# Patient Record
Sex: MALE | Race: WHITE | NOT HISPANIC OR LATINO | Employment: FULL TIME | URBAN - METROPOLITAN AREA
[De-identification: names, ages, dates, MRNs, and addresses within clinical notes are randomized per-mention and may not be internally consistent; named-entity substitution may affect disease eponyms.]

---

## 2019-01-29 ENCOUNTER — APPOINTMENT (OUTPATIENT)
Dept: RADIOLOGY | Facility: IMAGING CENTER | Age: 54
End: 2019-01-29
Attending: FAMILY MEDICINE
Payer: COMMERCIAL

## 2019-01-29 ENCOUNTER — OCCUPATIONAL MEDICINE (OUTPATIENT)
Dept: URGENT CARE | Facility: PHYSICIAN GROUP | Age: 54
End: 2019-01-29
Payer: COMMERCIAL

## 2019-01-29 VITALS
RESPIRATION RATE: 14 BRPM | TEMPERATURE: 98 F | SYSTOLIC BLOOD PRESSURE: 130 MMHG | OXYGEN SATURATION: 97 % | HEART RATE: 88 BPM | DIASTOLIC BLOOD PRESSURE: 84 MMHG | WEIGHT: 267 LBS

## 2019-01-29 DIAGNOSIS — S93.402A SPRAIN OF LEFT ANKLE, UNSPECIFIED LIGAMENT, INITIAL ENCOUNTER: ICD-10-CM

## 2019-01-29 DIAGNOSIS — S82.832A OTHER CLOSED FRACTURE OF DISTAL END OF LEFT FIBULA, INITIAL ENCOUNTER: ICD-10-CM

## 2019-01-29 PROCEDURE — 99204 OFFICE O/P NEW MOD 45 MIN: CPT | Performed by: FAMILY MEDICINE

## 2019-01-29 PROCEDURE — 73630 X-RAY EXAM OF FOOT: CPT | Mod: TC,LT,29 | Performed by: NURSE PRACTITIONER

## 2019-01-29 PROCEDURE — 73610 X-RAY EXAM OF ANKLE: CPT | Mod: TC,LT,29 | Performed by: NURSE PRACTITIONER

## 2019-01-29 NOTE — LETTER
EMPLOYEE’S CLAIM FOR COMPENSATION/ REPORT OF INITIAL TREATMENT  FORM C-4    EMPLOYEE’S CLAIM - PROVIDE ALL INFORMATION REQUESTED   First Name  Wang Last Name  South Birthdate                    1965                Sex  male Claim Number   Home Address  [address] Age  53 y.o. Height   Weight  121.1 kg (267 lb) [facility]     [place] Zip  [address] Telephone  [phone] (home)    Mailing Address  [address] [place]  [address] Primary Language Spoken  English    Insurer   Third Party   Monika Shah   Employee's Occupation (Job Title) When Injury or Occupational Disease Occurred      Employer's Name  [employer]  Telephone      Employer Address  [address]  [facility]     Date of Injury  1/8/2019               Hour of Injury  11:35 AM Date Employer Notified  1/8/2019 Last Day of Work after Injury or Occupational Disease  1/29/2019 Supervisor to Whom Injury Reported  Kevin Fuentes   Address or Location of Accident (if applicable)  [[address]]   What were you doing at the time of accident? (if applicable)  step backwards    How did this injury or occupational disease occur? (Be specific an answer in detail. Use additional sheet if necessary)  Stepped backwards on a tire chain twist ankle   If you believe that you have an occupational disease, when did you first have knowledge of the disability and it relationship to your employment?  n/a Witnesses to the Accident  none      Nature of Injury or Occupational Disease  Workers' Compensation  Part(s) of Body Injured or Affected  Ankle (L), ,     I certify that the above is true and correct to the best of my knowledge and that I have provided this information in order to obtain the benefits of [place]  [place]  I hereby authorize any physician, chiropractor, surgeon, practitioner, or other person, any hospital, including [facility] hospital, any medical service organization, any insurance company, or other institution or organization to release to each other, any medical or other information, including benefits paid or payable, pertinent to this injury or disease, except information relative to diagnosis, treatment and/or counseling for AIDS, psychological conditions, alcohol or controlled substances, for which I must give specific authorization.  A Photostat of this authorization shall be as valid as the original.     Date 01/29/19   [facility]    Employee’s Signature   THIS REPORT MUST BE COMPLETED AND MAILED WITHIN 3 WORKING DAYS OF TREATMENT   [facility]  Name of Facility  [place]   Date  1/29/2019 Diagnosis  (S82.832A) Other closed fracture of distal end of left fibula, initial encounter Is there evidence the injured employee was under the influence of alcohol and/or another controlled substance at the time of accident?   Hour  9:23 AM Description of Injury or Disease  The encounter diagnosis was Other closed fracture of distal end of left fibula, initial encounter. No   Treatment  closed fracture of distal end of left fibula, initial encounter  Posterior splint  Crutches - non wt bearing on left  Restrictions per D39  otc motrin, prn pain    Case transferred to ortho    Have you advised the patient to remain off work five days or more? No   X-Ray Findings      If Yes   From Date  To Date      From information given by the employee, together with medical evidence, can you directly connect this injury or occupational disease as job incurred?  Yes If No Full Duty  No Modified Duty  Yes   Is additional medical care by a physician indicated?  Yes If Modified Duty, Specify any Limitations / Restrictions  Restrictions per D39     Do you  "know of any previous injury or disease contributing to this condition or occupational disease?                            No   Date  1/29/2019 Print Doctor’s Name Mor Del Rio M.D. I certify the employer’s copy of  this form was mailed on:   Address  [address] Insurer’s Use Only     [place] Zip  [address]    Provider’s Tax ID Number  [id number] Telephone  Dept: [phone]            e-Signature: Dr. Rick Fraser, Medical Director Degree  MD        ORIGINAL-TREATING PHYSICIAN OR CHIROPRACTOR    PAGE 2-INSURER/TPA    PAGE 3-EMPLOYER    PAGE 4-EMPLOYEE             Form C-4 (rev10/07)              BRIEF DESCRIPTION OF RIGHTS AND BENEFITS  (Pursuant to [place]RS 616C.050)    Notice of Injury or Occupational Disease (Incident Report Form C-1): If an injury or occupational disease (OD) arises out of and in the  course of employment, you must provide written notice to your employer as soon as practicable, but no later than 7 days after the accident or  OD. Your employer shall maintain a sufficient supply of the required forms.    Claim for Compensation (Form C-4): If medical treatment is sought, the form C-4 is available at the place of initial treatment. A completed  \"Claim for Compensation\" (Form C-4) must be filed within 90 days after an accident or OD. The treating physician or chiropractor must,  within 3 working days after treatment, complete and mail to the employer, the employer's insurer and third-party , the Claim for  Compensation.    Medical Treatment: If you require medical treatment for your on-the-job injury or OD, you may be required to select a physician or  chiropractor from a list provided by your workers’ compensation insurer, if it has contracted with an Organization for Managed Care (MCO) or  Preferred Provider Organization (PPO) or providers of health care. If your employer has not entered into a contract with an MCO or PPO, you  may select a physician " or chiropractor from the Panel of Physicians and Chiropractors. Any medical costs related to your industrial injury or  OD will be paid by your insurer.    Temporary Total Disability (TTD): If your doctor has certified that you are unable to work for a period of at least 5 consecutive days, or 5  cumulative days in a 20-day period, or places restrictions on you that your employer does not accommodate, you may be entitled to TTD  compensation.    Temporary Partial Disability (TPD): If the wage you receive upon reemployment is less than the compensation for TTD to which you are  entitled, the insurer may be required to pay you TPD compensation to make up the difference. TPD can only be paid for a maximum of 24  months.    Permanent Partial Disability (PPD): When your medical condition is stable and there is an indication of a PPD as a result of your injury or  OD, within 30 days, your insurer must arrange for an evaluation by a rating physician or chiropractor to determine the degree of your PPD. The  amount of your PPD award depends on the date of injury, the results of the PPD evaluation and your age and wage.    Permanent Total Disability (PTD): If you are medically certified by a treating physician or chiropractor as permanently and totally disabled  and have been granted a PTD status by your insurer, you are entitled to receive monthly benefits not to exceed 66 2/3% of your average  monthly wage. The amount of your PTD payments is subject to reduction if you previously received a PPD award.    Vocational Rehabilitation Services: You may be eligible for vocational rehabilitation services if you are unable to return to the job due to a  permanent physical impairment or permanent restrictions as a result of your injury or occupational disease.    Transportation and Per Arnaldo Reimbursement: You may be eligible for travel expenses and per arnaldo associated with medical treatment.    Reopening: You may be able to reopen  your claim if your condition worsens after claim closure.    Appeal Process: If you disagree with a written determination issued by the insurer or the insurer does not respond to your request, you may  appeal to the Department of Administration, , by following the instructions contained in your determination letter. You must  appeal the determination within 70 days from the date of the determination letter at [address], [address], [place], [place]  [address], or [address], [address], [place], [place] [address]. If you disagree with the  decision, you may appeal to the  Department of Administration, . You must file your appeal within 30 days from the date of the  decision  letter at [address], [address], [place], [place] [address], or [address], [address], [place], [place] [address]. If you  disagree with a decision of an , you may file a petition for judicial review with the District Court. You must do so within 30  days of the Appeal Officer’s decision. You may be represented by an  at your own expense or you may contact the [facility] for possible  representation.    [place]  for Injured Workers (NAIW): If you disagree with a  decision, you may request that NAIW represent you  without charge at an  Hearing. For information regarding denial of benefits, you may contact the [facility] at: [address], [address], [place] [address], [phone], or [address], [place] [address], [phone]    To File a Complaint with the Division: If you wish to file a complaint with the  of the Division of Industrial Relations (DIR),  please contact the Workers’ Compensation Section, [address], [address], [place], [place] [address], telephone [phone], or  [address], [address], [place]  [place] [address], telephone [phone].    For assistance with Workers’ Compensation Issues: you may contact the Office of the Governor Consumer Health Assistance, [address] [place] [address], [facility] [address] [facility] [address], [place], [place] [address], Toll Free 1-102.184.1648, Web site: http://[employer].[facility].nv., E-mail  Frances@[facility].[email or web address].                                                                                                                                                                                                                                   __________________________________________________________________                                                                   _________01/29/19________                Employee Name / Signature                                                                                                                                                       Date                                                                                                                                                                                                     D-2 (rev. 10/07)

## 2019-01-29 NOTE — PROGRESS NOTES
Chief Complaint   Patient presents with   • Work-Related Injury     New WC for L ankle/ DOI: 01/08/2019/ [facility]            Subjective:      Chief Complaint   Patient presents with   • Work-Related Injury     New WC for L ankle/ DOI: 01/08/2019/ [facility]              Ankle Injury        DOI:  1/8      States that he rolled left foot/ankle while exiting his truck.      now c/o constant, dull, achy pain, worse with walking and wtbearing.        The injury mechanism was an inversion injury.  The pain is moderate. The pain has been constant since onset. Pertinent negatives include no  muscle weakness, numbness or tingling. The symptoms are aggravated by weight bearing and palpation. pt has tried nothing for the symptoms.        Past medical history was unremarkable and not pertinent to current issue  Social hx - denies tobacco, alcohol, drug use  Family hx was reviewed - no pertinent past family hx          Review of Systems   Constitutional: Negative for fever, chills and malaise/fatigue.   Eyes: Negative for vision changes, d/c.    Respiratory: Negative for cough and sputum production.    Cardiovascular: Negative for chest pain and palpitations.   Gastrointestinal: Negative for nausea, vomiting, abdominal pain, diarrhea and constipation.   Genitourinary: Negative for dysuria, urgency and frequency.   Skin: Negative for rash or  itching.   Neurological: Negative for dizziness and tingling.   Psychiatric/Behavioral: Negative for depression.   Hematologic/lymphatic - denies bruising or excessive bleeding  All other systems reviewed and are negative.            Objective:     Blood pressure 130/84, pulse 88, temperature 36.7 °C (98 °F), temperature source Temporal, resp. rate 14, weight 121.1 kg (267 lb), SpO2 97 %.    Physical Exam   Constitutional: pt is oriented to person, place, and time. Pt appears well-developed. No distress.   HENT:   Head: Normocephalic and atraumatic.   Eyes: Conjunctivae are normal.    Cardiovascular: Normal rate and regular rhythm.    Pulmonary/Chest: Effort normal and breath sounds normal.   Musculoskeletal:        Left ankle: pt exhibits swelling and ecchymosis. Pt exhibits normal range of motion. Tenderness. Lateral and medial malleolus tenderness found. Achilles tendon normal.        Left foot:  + Tender to palpation over lateral foot.  There is normal range of motion, normal capillary refill and no crepitus.   Neurological: pt is alert and oriented to person, place, and time. No cranial nerve deficit.   Skin: Skin is warm. Pt is not diaphoretic. No erythema.   Psychiatric: His behavior is normal.   Nursing note and vitals reviewed.         Narrative       1/29/2019 10:17 AM    HISTORY/REASON FOR EXAM:  Left lateral ankle pain after injury 3 weeks ago.      TECHNIQUE/EXAM DESCRIPTION AND NUMBER OF VIEWS:  3 views of the LEFT ankle.    COMPARISON: None    FINDINGS:    There is focal swelling of the left lateral ankle.    There is a transverse nondisplaced fracture of the distal fibula.    The alignment of the ankle is within normal limits.    There is a small calcaneal spur.   Impression       1.  There is a nondisplaced transverse fracture of the distal left fibula with overlying swelling.   Reading Provider Reading Date   Ambreen Denson M.D. Jan 29, 2019          Assessment/Plan:      1. Other closed fracture of distal end of left fibula, initial encounter  Posterior splint  Crutches - non wt bearing on left  Restrictions per D39  otc motrin, prn pain    Case transferred to ortho

## 2019-01-29 NOTE — LETTER
[facility] [place] [place] [address]  Phone:  [phone] - Fax:  [phone]   [facility] Progress Report and Disability Certification  Date of Service: 1/29/2019   No Show:  No  Date / Time of Next Visit: 2/9/2019   Claim Information   Patient Name: Wang Dia  Claim Number:     Employer: [employer]  Date of Injury: 1/8/2019     Insurer / TPA: Monika Shah  ID / SSN:     Occupation:   Diagnosis: The encounter diagnosis was Other closed fracture of distal end of left fibula, initial encounter.    Medical Information   Related to Industrial Injury? Yes    Subjective Complaints:       DOI:  1/8      States that he rolled left foot/ankle while exiting his truck.      now c/o constant, dull, achy pain, worse with walking and wtbearing.        The injury mechanism was an inversion injury.  The pain is moderate. The pain has been constant since onset. Pertinent negatives include no  muscle weakness, numbness or tingling. The symptoms are aggravated by weight bearing and palpation. pt has tried nothing for the symptoms.        Objective Findings: Musculoskeletal:        Left ankle: pt exhibits swelling and ecchymosis. Pt exhibits normal range of motion. Tenderness. Lateral and medial malleolus tenderness found. Achilles tendon normal.        Left foot:  + Tender to palpation over lateral foot.  There is normal range of motion, normal capillary refill and no crepitus.    Pre-Existing Condition(s):     Assessment:   Initial Visit    Status: Discharged / Care Transfer  Permanent Disability:No    Plan: Medication    Diagnostics: X-ray    Comments:       Disability Information   Status: Released to Restricted Duty    From:  1/29/2019  Through: 2/9/2019 Restrictions are: Temporary   Physical Restrictions   Sitting:    Standing:  < or = to 2 hrs/day Stooping:    Bending:      Squatting:    Walking:  < or = to 4  hrs/day  Comments:WITH CRUTCHES Climbing:    Pushing:      Pulling:    Other:    Reaching Above Shoulder (L):   Reaching Above Shoulder (R):       Reaching Below Shoulder (L):    Reaching Below Shoulder (R):      Not to exceed Weight Limits   Carrying(hrs):   Weight Limit(lb): < or = to 10 pounds Lifting(hrs):   Weight  Limit(lb): < or = to 10 pounds   Comments: Other closed fracture of distal end of left fibula, initial encounter  Posterior splint  Crutches - non wt bearing on left  Restrictions per D39  otc motrin, prn pain    Case transferred to ortho    Repetitive Actions   Hands: i.e. Fine Manipulations from Grasping:     Feet: i.e. Operating Foot Controls: 0 hrs/day  Comments:NO DRIVING   Driving / Operate Machinery:     Physician Name: Mor Del Rio M.D. Physician Signature:   e-Signature: Dr. Rick Fraser, Medical Director   Clinic Name / Location: [address] [address] Clinic Phone Number: Dept: [phone]   Appointment Time: 8:50 Am Visit Start Time: 9:23 AM   Check-In Time:  8:58 Am Visit Discharge Time:  11:43AM   Original-Treating Physician or Chiropractor    Page 2-Insurer/TPA    Page 3-Employer    Page 4-Employee

## 2019-02-09 ENCOUNTER — OCCUPATIONAL MEDICINE (OUTPATIENT)
Dept: URGENT CARE | Facility: PHYSICIAN GROUP | Age: 54
End: 2019-02-09
Payer: COMMERCIAL

## 2019-02-09 VITALS
BODY MASS INDEX: 38.86 KG/M2 | HEIGHT: 71 IN | SYSTOLIC BLOOD PRESSURE: 142 MMHG | TEMPERATURE: 97.9 F | HEART RATE: 97 BPM | OXYGEN SATURATION: 96 % | DIASTOLIC BLOOD PRESSURE: 90 MMHG | RESPIRATION RATE: 18 BRPM | WEIGHT: 277.6 LBS

## 2019-02-09 DIAGNOSIS — S82.832D OTHER CLOSED FRACTURE OF DISTAL END OF LEFT FIBULA WITH ROUTINE HEALING, SUBSEQUENT ENCOUNTER: ICD-10-CM

## 2019-02-09 PROCEDURE — 99214 OFFICE O/P EST MOD 30 MIN: CPT | Mod: 29 | Performed by: NURSE PRACTITIONER

## 2019-02-09 ASSESSMENT — ENCOUNTER SYMPTOMS
SHORTNESS OF BREATH: 0
NAUSEA: 0
CHILLS: 0
TINGLING: 0
VOMITING: 0
FEVER: 0
SENSORY CHANGE: 0
WHEEZING: 0

## 2019-02-09 NOTE — PROGRESS NOTES
"Subjective:   Wang Dia is a 53 y.o. male who presents for Follow-Up (Wc DOI 1/8/19 L ankle )    DOI 1/8/2019 Follow up: Patient states his work restrictions are going well. He has seen the surgeon and follow up appointment scheduled for 2/25/2019. Pain currently 2/10. He has been using crutches and wearing an airboot as instructed. Pain worsened when bearing weight.   HPI   Initial Copied HPI:  DOI:  1/8  States that he rolled left foot/ankle while exiting his truck.    now c/o constant, dull, achy pain, worse with walking and wtbearing.   The injury mechanism was an inversion injury.  The pain is moderate. The pain has been constant since onset. Pertinent negatives include no  muscle weakness, numbness or tingling. The symptoms are aggravated by weight bearing and palpation. pt has tried nothing for the symptoms.      Review of Systems   Constitutional: Negative for chills and fever.   Respiratory: Negative for shortness of breath and wheezing.    Cardiovascular: Negative for chest pain.   Gastrointestinal: Negative for nausea and vomiting.   Musculoskeletal: Positive for joint pain.        Left ankle injury   Neurological: Negative for tingling and sensory change.       PMH:  has no past medical history on file.  MEDS: No current outpatient prescriptions on file.  ALLERGIES: No Known Allergies  SURGHX: History reviewed. No pertinent surgical history.  SOCHX:  reports that he has never smoked. His smokeless tobacco use includes Chew. He reports that he drinks about 1.2 oz of alcohol per week . He reports that he does not use drugs.  FH: Family history was reviewed, no pertinent findings to report     Objective:   /90 (BP Location: Left arm, Patient Position: Sitting, BP Cuff Size: Large adult)   Pulse 97   Temp 36.6 °C (97.9 °F) (Temporal)   Resp 18   Ht 1.791 m (5' 10.5\")   Wt (!) 125.9 kg (277 lb 9.6 oz)   SpO2 96%   BMI 39.27 kg/m²   Physical Exam   Constitutional: He appears " well-developed and well-nourished. No distress.   HENT:   Head: Normocephalic.   Cardiovascular: Normal rate, regular rhythm and normal heart sounds.    Pulses:       Dorsalis pedis pulses are 2+ on the left side.        Posterior tibial pulses are 2+ on the left side.   Pulmonary/Chest: Effort normal and breath sounds normal. No respiratory distress. He has no decreased breath sounds. He has no wheezes.   Musculoskeletal:        Left ankle: He exhibits swelling. He exhibits normal range of motion and normal pulse. Tenderness. Medial malleolus tenderness found.   Neurological: He is alert.   No numbness or tingling   Skin: Skin is warm. No rash noted. He is not diaphoretic.   Psychiatric: He has a normal mood and affect. His speech is normal and behavior is normal. Judgment and thought content normal.   Vitals reviewed.      Left ankle: pt exhibits swelling. ROM limited due to air boot. Lateral and medial malleolus tenderness found.    Left foot:  Normal cap refill. Pulses +2   Assessment/Plan:   Assessment    1. Other closed fracture of distal end of left fibula with routine healing, subsequent encounter    Restrictions same per D39; Will follow up with Ortho prior to return visit to our office. If symptoms worsen, he is to return sooner.    May take over-the-counter Ibuprofen 800 mg every 8 hours as needed for pain    Differential diagnosis, natural history, supportive care, and indications for immediate follow-up discussed.

## 2019-02-09 NOTE — LETTER
[facility] [place] [place] [address]  Phone:  [phone] - Fax:  [phone]   [facility] Progress Report and Disability Certification  Date of Service: 2/9/2019   No Show:  No  Date / Time of Next Visit: 3/2/2019 @ 9:00 AM   Claim Information   Patient Name: Wang Dia  Claim Number:     Employer: [employer]  Date of Injury: 1/8/2019     Insurer / TPA: Monika Shah  ID / SSN:     Occupation:   Diagnosis: The encounter diagnosis was Other closed fracture of distal end of left fibula with routine healing, subsequent encounter.    Medical Information   Related to Industrial Injury? Yes    Subjective Complaints:  DOI 1/8/2019 Follow up: Patient states his work restrictions are going well. He has seen the surgeon and follow up appointment scheduled for 2/25/2019. Pain currently 2/10. He has been using crutches and wearing an airboot as instructed. Pain worsened when bearing weight.   Objective Findings:   Left ankle: pt exhibits swelling. ROM limited due to air boot. Lateral and medial malleolus tenderness found.    Left foot:  Normal cap refill. Pulses +2   Pre-Existing Condition(s):     Assessment:   Condition Same    Status: Additional Care Required  Permanent Disability:No    Plan:      Diagnostics:      Comments:       Disability Information   Status: Released to Restricted Duty    From:  2/9/2019  Through: 3/2/2019 Restrictions are: Temporary   Physical Restrictions   Sitting:    Standing:  < or = to 2 hrs/day Stooping:    Bending:      Squatting:    Walking:  < or = to 4 hrs/day Climbing:    Pushing:      Pulling:    Other:    Reaching Above Shoulder (L):   Reaching Above Shoulder (R):       Reaching Below Shoulder (L):    Reaching Below Shoulder (R):      Not to exceed Weight Limits   Carrying(hrs):   Weight Limit(lb): < or = to 10 pounds Lifting(hrs):   Weight  Limit(lb): < or = to 10 pounds      Comments: Must be wearing boot and using crutches. He is to follow up with Ortho prior to his return to clinic.    Repetitive Actions   Hands: i.e. Fine Manipulations from Grasping:     Feet: i.e. Operating Foot Controls:     Driving / Operate Machinery:     Physician Name: EDWARD Biswas Physician Signature: JOLENE Castillo e-Signature: Dr. Rick Fraser, Medical Director   Clinic Name / Location: [address] [address] Clinic Phone Number: Dept: [phone]   Appointment Time: 9:00 Am Visit Start Time: 9:02 AM   Check-In Time:  8:34 Am Visit Discharge Time: 9:31 AM   Original-Treating Physician or Chiropractor    Page 2-Insurer/TPA    Page 3-Employer    Page 4-Employee